# Patient Record
Sex: MALE | Race: WHITE | NOT HISPANIC OR LATINO | URBAN - METROPOLITAN AREA
[De-identification: names, ages, dates, MRNs, and addresses within clinical notes are randomized per-mention and may not be internally consistent; named-entity substitution may affect disease eponyms.]

---

## 2018-08-26 ENCOUNTER — EMERGENCY (EMERGENCY)
Facility: HOSPITAL | Age: 58
LOS: 0 days | Discharge: HOME | End: 2018-08-26
Admitting: PHYSICIAN ASSISTANT

## 2018-08-26 VITALS
RESPIRATION RATE: 18 BRPM | DIASTOLIC BLOOD PRESSURE: 78 MMHG | SYSTOLIC BLOOD PRESSURE: 120 MMHG | HEART RATE: 80 BPM | TEMPERATURE: 99 F | OXYGEN SATURATION: 96 %

## 2018-08-26 DIAGNOSIS — K08.89 OTHER SPECIFIED DISORDERS OF TEETH AND SUPPORTING STRUCTURES: ICD-10-CM

## 2018-08-26 RX ORDER — AMOXICILLIN 250 MG/5ML
1 SUSPENSION, RECONSTITUTED, ORAL (ML) ORAL
Qty: 21 | Refills: 0 | OUTPATIENT
Start: 2018-08-26 | End: 2018-09-01

## 2018-08-26 NOTE — ED PROVIDER NOTE - ENMT, MLM
Airway patent, Nasal mucosa clear. Mouth with normal mucosa. Throat has no vesicles, no oropharyngeal exudates and uvula is midline.  + tenderness and decay to tooth # 19 ; no gum fluctuance

## 2018-08-26 NOTE — ED ADULT NURSE NOTE - OBJECTIVE STATEMENT
Patient present to ED with complains of left sided dental pain for about 2 days, denies fever, chills, nausea, vomiting, and chest pain.

## 2018-08-26 NOTE — ED PROVIDER NOTE - OBJECTIVE STATEMENT
57 y/o M, no significant PMHx, presents to the ED with complaints of dental pain x three months. Patient admits to pain along his left lower tooth without associated facial swelling, fever, chills, nausea and vomiting. He went to the dental clinic when pain began and was advised that he would need a dental extraction and excisional biopsy however he has not yet followed up.

## 2018-08-26 NOTE — ED PROVIDER NOTE - ENMT NEGATIVE STATEMENT, MLM
+ Dental Pain ; Ears: no ear pain and no hearing problems.Nose: no nasal congestion and no nasal drainage.Mouth/Throat: no dysphagia, no hoarseness and no throat pain.Neck: no lumps, no pain, no stiffness and no swollen glands.

## 2018-08-26 NOTE — ED ADULT NURSE NOTE - NSIMPLEMENTINTERV_GEN_ALL_ED
Implemented All Universal Safety Interventions:  [place] to call system. Call bell, personal items and telephone within reach. Instruct patient to call for assistance. Room bathroom lighting operational. Non-slip footwear when patient is off stretcher. Physically safe environment: no spills, clutter or unnecessary equipment. Stretcher in lowest position, wheels locked, appropriate side rails in place.

## 2019-01-01 ENCOUNTER — OUTPATIENT (OUTPATIENT)
Dept: OUTPATIENT SERVICES | Facility: HOSPITAL | Age: 59
LOS: 1 days | End: 2019-01-01
Payer: COMMERCIAL

## 2019-01-01 PROCEDURE — G9001: CPT

## 2019-01-18 ENCOUNTER — EMERGENCY (EMERGENCY)
Facility: HOSPITAL | Age: 59
LOS: 0 days | Discharge: HOME | End: 2019-01-18
Admitting: PHYSICIAN ASSISTANT

## 2019-01-18 VITALS
SYSTOLIC BLOOD PRESSURE: 124 MMHG | HEART RATE: 83 BPM | TEMPERATURE: 97 F | RESPIRATION RATE: 18 BRPM | DIASTOLIC BLOOD PRESSURE: 78 MMHG | OXYGEN SATURATION: 97 %

## 2019-01-18 DIAGNOSIS — K08.89 OTHER SPECIFIED DISORDERS OF TEETH AND SUPPORTING STRUCTURES: ICD-10-CM

## 2019-01-18 DIAGNOSIS — Z71.89 OTHER SPECIFIED COUNSELING: ICD-10-CM

## 2019-01-18 DIAGNOSIS — Z79.2 LONG TERM (CURRENT) USE OF ANTIBIOTICS: ICD-10-CM

## 2019-01-18 NOTE — PROGRESS NOTE ADULT - SUBJECTIVE AND OBJECTIVE BOX
Patient is a 58y old  Male who presents with a chief complaint of dental pain from lower left side.     HPI: Pain started a few months back, and patient noticed there was a sinus tract coming and going from the alveolar ridge distal to tooth #19.     PAST MEDICAL & SURGICAL HISTORY:  ( -  ) heart valve replacement  ( -  ) joint replacement  ( -  ) pregnancy    MEDICATIONS  (STANDING):  none  MEDICATIONS  (PRN):  none    Allergies  No known drug allergies    FAMILY HISTORY:    *SOCIAL HISTORY: ( -  ) Tobacco; ( -  ) ETOH    Vital Signs Last 24 Hrs  T(C): 36.3 (18 Jan 2019 08:41), Max: 36.3 (18 Jan 2019 08:41)  T(F): 97.4 (18 Jan 2019 08:41), Max: 97.4 (18 Jan 2019 08:41)  HR: 83 (18 Jan 2019 08:41) (83 - 83)  BP: 124/78 (18 Jan 2019 08:41) (124/78 - 124/78)  BP(mean): --  RR: 18 (18 Jan 2019 08:41) (18 - 18)  SpO2: 97% (18 Jan 2019 08:41) (97% - 97%)    LABS: none ordered    Last Dental Visit: << Last year >>    EOE:  TMJ ( -  ) clicks                     ( -  ) pops                     ( -  ) crepitus             Mandible: WNL             Facial bones and MOM: intact             ( -  ) trismus             ( -  ) lymphadenopathy             ( -  ) swelling             ( -  ) asymmetry             ( -  ) palpation             ( -  ) dyspnea             ( -  ) dysphagia             ( -  ) loss of consciousness    IOE:  Permanent dentition, partially edentulous.                        hard/soft palate:  ( -  ) palatal torus, <<No pathology noted>>            tongue/FOM <<No pathology noted>>            labial/buccal mucosa <<No pathology noted>>           ( +  ) percussion           ( +  ) palpation           ( -  ) swelling            ( +  ) abscess           ( -  ) sinus tract    *DENTAL RADIOGRAPHS: 1 periapical x-ray taken of #19.      *ASSESSMENT: Patient had tooth #18 extracted on 2016 and informed that he had a periapical lesion on tooth #19. Last year went to a private dentist and maxillofacial to get evaluation done for implants (April 2018). He was informed of periapical lesion and extensive radiolucency extending to area of tooth #18. Patient came today with pain and said that private dentist Tooth #19 was deemed nonrestorable and needs extraction, but due to extensive lesion patient was rescheduled for Wednesday with OMFS Attending. Clindamycin 150mg 1q6.     *PLAN: Extraction of tooth #19 on next Wednesday.    RECOMMENDATIONS:  1) Prescription of antibiotics given.   2) If any difficulty swallowing/breathing, fever occur, return to ER.     Anabel Friend, DMD  Spectra [phone]

## 2019-01-18 NOTE — ED PROVIDER NOTE - PHYSICAL EXAMINATION
GENERAL:  well appearing, non-toxic male in no acute distress  SKIN: skin warm, pink and dry. MMM. no facial swelling.   ENT:  Airway intact. Patent oropharynx without erythema or exudate. Uvula midline.   NECK: Neck supple. No meningismus. Trachea midline  PULM: CTAB. Normal respiratory effort. No respiratory distress. No wheezes, stridor, rales or rhonchi. No retractions  CV: RRR, no M/R/G.   ABD: Soft, non-tender, non-distended  NEURO: A+Ox3, no sensory/motor deficits

## 2019-01-18 NOTE — ED PROVIDER NOTE - OBJECTIVE STATEMENT
59 yo male with 57 yo male with no significant pmh presents to the ED c/o left lower dental pain x 1 week. Patient admits hes had dental issues x 1 year. Patient went to his dentist on Wednesday was told he will need oral surgery but patient couldn't get an appointment for several weeks. Patient states he was told he has an infection of his gingiva and tooth. Patient currently not on antibiotic. Denies fever, chills facial pain/swelling, headache, dizziness, throat pain, change in voice, excessive drooling, chest pain, sob, abd pain, N/V, UE/Le weakness or paresthesias.

## 2019-01-18 NOTE — ED ADULT TRIAGE NOTE - CHIEF COMPLAINT QUOTE
Left lower dental pain. sent in by md for worsening infection. patient is not on abx- sent in for dental. no fever no facial swelling

## 2019-01-18 NOTE — ED PROVIDER NOTE - NS ED ROS FT
Constitutional: no fever, chills   ENT: + left lower dental pain. no facial swelling.  no throat pain, no change in voice, no excessive drooling, no ear pain  Cardiovascular: no chest pain, no sob  Respiratory: no cough, no shortness of breat  Gastrointestinal: no nausea, vomiting or abd pain  Integumentary: no rash or skin changes. no edema  Neurological: no headache, no dizziness, no visual changes, no UE/LE weakness or paresthesias. no change in mental status. no neck pain or stiffness.

## 2019-01-18 NOTE — ED PROVIDER NOTE - MEDICAL DECISION MAKING DETAILS
Patient here for left sided lower dental pain, no facial swelling, no abscess, no fever. Patient brought to dental clinic for further evaluation and treatment.

## 2019-01-23 ENCOUNTER — OUTPATIENT (OUTPATIENT)
Dept: OUTPATIENT SERVICES | Facility: HOSPITAL | Age: 59
LOS: 1 days | Discharge: HOME | End: 2019-01-23

## 2020-06-01 ENCOUNTER — OUTPATIENT (OUTPATIENT)
Dept: OUTPATIENT SERVICES | Facility: HOSPITAL | Age: 60
LOS: 1 days | End: 2020-06-01

## 2020-07-07 DIAGNOSIS — Z71.89 OTHER SPECIFIED COUNSELING: ICD-10-CM

## 2021-05-21 PROBLEM — Z00.00 ENCOUNTER FOR PREVENTIVE HEALTH EXAMINATION: Status: ACTIVE | Noted: 2021-05-21

## 2021-06-03 ENCOUNTER — APPOINTMENT (OUTPATIENT)
Dept: UROLOGY | Facility: CLINIC | Age: 61
End: 2021-06-03
Payer: COMMERCIAL

## 2021-06-03 VITALS — HEIGHT: 68 IN | WEIGHT: 171 LBS | TEMPERATURE: 97.9 F | BODY MASS INDEX: 25.91 KG/M2

## 2021-06-03 DIAGNOSIS — Z80.0 FAMILY HISTORY OF MALIGNANT NEOPLASM OF DIGESTIVE ORGANS: ICD-10-CM

## 2021-06-03 DIAGNOSIS — Z87.442 PERSONAL HISTORY OF URINARY CALCULI: ICD-10-CM

## 2021-06-03 DIAGNOSIS — Z78.9 OTHER SPECIFIED HEALTH STATUS: ICD-10-CM

## 2021-06-03 PROCEDURE — 99204 OFFICE O/P NEW MOD 45 MIN: CPT

## 2021-06-07 ENCOUNTER — RESULT REVIEW (OUTPATIENT)
Age: 61
End: 2021-06-07

## 2021-06-14 LAB
ALBUMIN SERPL ELPH-MCNC: 4.2 G/DL
ALP BLD-CCNC: 84 U/L
ALT SERPL-CCNC: 15 U/L
AST SERPL-CCNC: 20 U/L
BASOPHILS # BLD AUTO: 0.03 K/UL
BASOPHILS NFR BLD AUTO: 0.4 %
BILIRUB DIRECT SERPL-MCNC: <0.2 MG/DL
BILIRUB INDIRECT SERPL-MCNC: >0.2 MG/DL
BILIRUB SERPL-MCNC: 0.4 MG/DL
EOSINOPHIL # BLD AUTO: 0.18 K/UL
EOSINOPHIL NFR BLD AUTO: 2.3 %
ESTRADIOL SERPL-MCNC: 35 PG/ML
HCT VFR BLD CALC: 43.6 %
HGB BLD-MCNC: 14.6 G/DL
IMM GRANULOCYTES NFR BLD AUTO: 0.4 %
LH SERPL-ACNC: 11.7 IU/L
LYMPHOCYTES # BLD AUTO: 2.14 K/UL
LYMPHOCYTES NFR BLD AUTO: 27.5 %
MAN DIFF?: NORMAL
MCHC RBC-ENTMCNC: 30.3 PG
MCHC RBC-ENTMCNC: 33.5 G/DL
MCV RBC AUTO: 90.5 FL
MONOCYTES # BLD AUTO: 0.68 K/UL
MONOCYTES NFR BLD AUTO: 8.8 %
NEUTROPHILS # BLD AUTO: 4.71 K/UL
NEUTROPHILS NFR BLD AUTO: 60.6 %
PLATELET # BLD AUTO: 267 K/UL
PROLACTIN SERPL-MCNC: 7.3 NG/ML
PROT SERPL-MCNC: 6.9 G/DL
PSA FREE FLD-MCNC: 19 %
PSA FREE SERPL-MCNC: 0.35 NG/ML
PSA SERPL-MCNC: 1.9 NG/ML
RBC # BLD: 4.82 M/UL
RBC # FLD: 13.5 %
WBC # FLD AUTO: 7.77 K/UL

## 2021-06-15 LAB — SHBG SERPL-SCNC: 67.2 NMOL/L

## 2021-06-16 LAB
TESTOSTERONE FREE/WEAKLY BND: 115.2 NG/DL
TESTOSTERONE TOTAL S: 907 NG/DL
TESTOSTERONE, % FREE/WEAKLY BND: 12.7 %

## 2021-06-17 ENCOUNTER — OUTPATIENT (OUTPATIENT)
Dept: OUTPATIENT SERVICES | Facility: HOSPITAL | Age: 61
LOS: 1 days | Discharge: HOME | End: 2021-06-17
Payer: MEDICAID

## 2021-06-17 DIAGNOSIS — Z87.442 PERSONAL HISTORY OF URINARY CALCULI: ICD-10-CM

## 2021-06-17 PROCEDURE — 74019 RADEX ABDOMEN 2 VIEWS: CPT | Mod: 26

## 2021-06-17 PROCEDURE — 76770 US EXAM ABDO BACK WALL COMP: CPT | Mod: 26

## 2021-06-29 LAB
TESTOST BND SERPL-MCNC: 10.8 PG/ML
TESTOST SERPL-MCNC: 887 NG/DL

## 2021-09-17 ENCOUNTER — APPOINTMENT (OUTPATIENT)
Dept: UROLOGY | Facility: CLINIC | Age: 61
End: 2021-09-17
Payer: MEDICAID

## 2021-09-17 VITALS
HEIGHT: 68 IN | HEART RATE: 65 BPM | SYSTOLIC BLOOD PRESSURE: 113 MMHG | BODY MASS INDEX: 25.76 KG/M2 | WEIGHT: 170 LBS | DIASTOLIC BLOOD PRESSURE: 66 MMHG

## 2021-09-17 DIAGNOSIS — N48.6 INDURATION PENIS PLASTICA: ICD-10-CM

## 2021-09-17 DIAGNOSIS — R68.89 OTHER GENERAL SYMPTOMS AND SIGNS: ICD-10-CM

## 2021-09-17 DIAGNOSIS — Z87.898 PERSONAL HISTORY OF OTHER SPECIFIED CONDITIONS: ICD-10-CM

## 2021-09-17 DIAGNOSIS — N50.0 ATROPHY OF TESTIS: ICD-10-CM

## 2021-09-17 PROCEDURE — 99214 OFFICE O/P EST MOD 30 MIN: CPT

## 2021-11-08 ENCOUNTER — APPOINTMENT (OUTPATIENT)
Dept: UROLOGY | Facility: CLINIC | Age: 61
End: 2021-11-08
Payer: MEDICAID

## 2021-11-08 VITALS
SYSTOLIC BLOOD PRESSURE: 116 MMHG | HEIGHT: 68 IN | DIASTOLIC BLOOD PRESSURE: 70 MMHG | WEIGHT: 171 LBS | HEART RATE: 90 BPM | BODY MASS INDEX: 25.91 KG/M2

## 2021-11-08 DIAGNOSIS — N20.0 CALCULUS OF KIDNEY: ICD-10-CM

## 2021-11-08 PROCEDURE — 99214 OFFICE O/P EST MOD 30 MIN: CPT

## 2021-11-08 NOTE — PHYSICAL EXAM
[General Appearance - Well Developed] : well developed [General Appearance - Well Nourished] : well nourished [Normal Appearance] : normal appearance [Well Groomed] : well groomed [General Appearance - In No Acute Distress] : no acute distress [FreeTextEntry1] : Thin [] : no respiratory distress [Respiration, Rhythm And Depth] : normal respiratory rhythm and effort [Exaggerated Use Of Accessory Muscles For Inspiration] : no accessory muscle use [Oriented To Time, Place, And Person] : oriented to person, place, and time [Affect] : the affect was normal [Mood] : the mood was normal [Not Anxious] : not anxious [Normal Station and Gait] : the gait and station were normal for the patient's age

## 2021-11-08 NOTE — PHYSICAL EXAM
[General Appearance - Well Developed] : well developed [General Appearance - Well Nourished] : well nourished [Normal Appearance] : normal appearance [Well Groomed] : well groomed [General Appearance - In No Acute Distress] : no acute distress [FreeTextEntry1] : Thin [] : no respiratory distress [Respiration, Rhythm And Depth] : normal respiratory rhythm and effort [Exaggerated Use Of Accessory Muscles For Inspiration] : no accessory muscle use [Oriented To Time, Place, And Person] : oriented to person, place, and time [Affect] : the affect was normal [Mood] : the mood was normal [Not Anxious] : not anxious

## 2021-11-08 NOTE — PHYSICAL EXAM
[General Appearance - Well Developed] : well developed [General Appearance - Well Nourished] : well nourished [Normal Appearance] : normal appearance [Well Groomed] : well groomed [General Appearance - In No Acute Distress] : no acute distress [FreeTextEntry1] : Thin [] : no respiratory distress [Respiration, Rhythm And Depth] : normal respiratory rhythm and effort [Exaggerated Use Of Accessory Muscles For Inspiration] : no accessory muscle use [Oriented To Time, Place, And Person] : oriented to person, place, and time [Mood] : the mood was normal [Affect] : the affect was normal [Not Anxious] : not anxious [Normal Station and Gait] : the gait and station were normal for the patient's age

## 2021-11-08 NOTE — ASSESSMENT
[FreeTextEntry1] : With respect to the kidney stone sent him to get a CAT scan to see what the Hounsfield units are as if it is indeed about 300 we can try chemical dissolution.  If it is higher than that we will have to discuss with him his options\par \par With respect to the abnormal ANTHONY and PSA we will just follow that over time\par \par With respect to the ED I still need [place] criteria classification if he can get that for us we can try hand do with the need for sildenafil.\par \par With respect to the atrophic testicles they are working well enough but the elevated LH tells us that the Leydig cells are under strain it is something that will need to be watched.

## 2021-11-08 NOTE — LETTER BODY
[Dear  ___] : Dear  [unfilled], [Courtesy Letter:] : I had the pleasure of seeing your patient, [unfilled], in my office today. [Please see my note below.] : Please see my note below. [Sincerely,] : Sincerely, [FreeTextEntry2] : Ac Sheets MD\par [address]\[place] [address]

## 2021-11-08 NOTE — ASSESSMENT
[FreeTextEntry1] : There are several issues here.  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  As well I would need [place] criteria classification.

## 2021-11-08 NOTE — LETTER BODY
[Dear  ___] : Dear  [unfilled], [Courtesy Letter:] : I had the pleasure of seeing your patient, [unfilled], in my office today. [Please see my note below.] : Please see my note below. [Sincerely,] : Sincerely, [FreeTextEntry2] : Ac Sheets MD\par [address]\[place] [address]

## 2021-11-08 NOTE — LETTER HEADER
[FreeTextEntry3] : Uvaldo Doll M.D.\par Director of Urology\par [facility]\par [address] [place] [address], [address]\[place] [address]

## 2021-11-08 NOTE — LETTER BODY
[Dear  ___] : Dear  [unfilled], [Courtesy Letter:] : I had the pleasure of seeing your patient, [unfilled], in my office today. [Please see my note below.] : Please see my note below. [Sincerely,] : Sincerely, [FreeTextEntry2] : Ac Sheets MD\par [address]\[place] [address]

## 2021-11-08 NOTE — LETTER HEADER
[FreeTextEntry3] : Uvaldo Doll M.D.\par Director of Urology\par [facility]\par [address] [place] [address], [address]\[place] [address]

## 2021-11-08 NOTE — LETTER HEADER
[FreeTextEntry3] : Uvaldo Doll M.D.\par Director of Urology\par [facility]\par [address] [place] [address], [address]\[place] [address]

## 2021-11-08 NOTE — HISTORY OF PRESENT ILLNESS
[FreeTextEntry1] : Eyad is a 61-year-old male born May 18, 1960 seen on Nemo 3 and then September 17, 2021.  He has a history of stones, sometime in May he was in the shower and he thought he felt the stone come out because he had not had a evaluation in a while will check to see if they were more stones so we sent him for a CAT scan.  That was not done until October 21, 2021 and is here to review\par \par As well his erections are not what he feels that they should be.  All studies were checked in July 2021 were normal.  We are awaiting [employer] card.  To see if he would be an acceptable candidate for PDE 5 inhibitors. [Erectile Dysfunction] : Erectile Dysfunction

## 2021-11-08 NOTE — HISTORY OF PRESENT ILLNESS
[Erectile Dysfunction] : Erectile Dysfunction [FreeTextEntry1] : Eyad is a 61-year-old male who about 8 years ago had left-sided renal colic and ended up passing a stone without surgical intervention.  He never had an evaluation and has not seen a urologist since that time.  In general he has no known medical problems he was feeling good until about a month ago.  At that time he noticed some right-sided discomfort and then about 30 days ago when in the shower he was trying to urinate he had an inability, he felt a sharp pain at the tip of the penis and after about 20 seconds it felt like something shot out and then he was able to urinate normally.  Since then the pain in his side has gone away.  He does not know if it was a stone, he did not see a stone.  Given his past history he would like to know if he has any more stones as if so would like to take care of it before it bothers him\par \par Please note he has a history of ED treated with sildenafil in the past he has not used it in a while but he thinks his wife would want him to get a new prescription

## 2021-11-08 NOTE — PHYSICAL EXAM
[General Appearance - Well Developed] : well developed [General Appearance - Well Nourished] : well nourished [Normal Appearance] : normal appearance [Well Groomed] : well groomed [General Appearance - In No Acute Distress] : no acute distress [Heart Rate And Rhythm] : Heart rate and rhythm were normal [Respiration, Rhythm And Depth] : normal respiratory rhythm and effort [Exaggerated Use Of Accessory Muscles For Inspiration] : no accessory muscle use [Auscultation Breath Sounds / Voice Sounds] : lungs were clear to auscultation bilaterally [Abdomen Soft] : soft [Abdomen Tenderness] : non-tender [Abdomen Hernia] : no hernia was discovered [Costovertebral Angle Tenderness] : no ~M costovertebral angle tenderness [Urethral Meatus] : meatus normal [Penis Abnormality] : normal circumcised penis [Scrotum] : the scrotum was normal [Epididymis] : the epididymides were normal [Testes Tenderness] : no tenderness of the testes [Testes Mass (___cm)] : there were no testicular masses [Anus Abnormality] : the anus and perineum were normal [Rectal Exam - Rectum] : digital rectal exam was normal [Prostate Enlargement] : the prostate was not enlarged [Prostate Tenderness] : the prostate was not tender [Prostate Size ___ gm] : prostate size [unfilled] gm [Normal Station and Gait] : the gait and station were normal for the patient's age [] : no rash [FreeTextEntry1] : DTR's & BC reflexes were intact

## 2021-11-08 NOTE — LETTER BODY
[Dear  ___] : Dear  [unfilled], [Consult Letter:] : I had the pleasure of evaluating your patient, [unfilled]. [Please see my note below.] : Please see my note below. [Consult Closing:] : Thank you very much for allowing me to participate in the care of this patient.  If you have any questions, please do not hesitate to contact me. [Sincerely,] : Sincerely, [FreeTextEntry2] : Ac Sheets MD\par [address]\[place] [address]

## 2021-11-08 NOTE — LETTER HEADER
[FreeTextEntry3] : Uvaldo Doll M.D.\par Director of Urology\par [facility]\par [address] [place] [address], [address]\[place] [address]

## 2021-11-08 NOTE — ASSESSMENT
[FreeTextEntry1] : With respect to the CAT scan he has no stones, as far as his lung fields he will take that up with Dr. Sheets.\par \par With respect to his ED he has no hormonal issues his [place] criteria classification is "I" and the best options at this point in healthcare would be the PDE 5 inhibitors.  We discussed the difference between generic sildenafil as well as Cialis including but not limited to time to onset duration of response as well as cost with the various side effects.  And he chooses to go ahead with sildenafil.  Is cheaper, he tried it in the past and it did not bother him.  Risks discussed included nonischemic arterial optic neuropathy, ringing in the ears, low vision, dyspepsia, and if he goes with Cialis some of these do not happen but you can get severe muscle aches.  As well neither of them might work.  We will give him a prescription for 20 pills he will come back in 2 months if it is working well great if is not working well he wants to consider other options he will call I will see if we can get him in for Doppler study in the hopes of efficacy and then he can learn self injection

## 2021-11-08 NOTE — HISTORY OF PRESENT ILLNESS
[FreeTextEntry1] : Eyad is a 61-year-old male born May 18, 1960 seen on Nemo 3, 2021 with transient voiding dysfunction, history of kidney stones, erectile dysfunction and atrophic testicles.  We sent him for a renal bladder ultrasound, blood tests including hormones and given the slight prostate asymmetry we also got a PSA and given his erectile dysfunction asked him to get [place] criteria classification.  He tells me that his doctor had the paper was going to send it.  Unfortunately I do not have it in my computer and he will go by his doctor and get it again he had the rest of the test done and is here to review.  Please note he tells me that voiding dysfunction was truly transient and since last seen he has no trouble with urination.  He has a slight curve of the penis it is not bothersome to him\par \par At the time he had presented he had right-sided flank pain and thought he may have passed a stone he no longer has that pain [Erectile Dysfunction] : Erectile Dysfunction

## 2021-11-08 NOTE — HISTORY OF PRESENT ILLNESS
[FreeTextEntry1] : Eyad is a 61-year-old male born May 18, 1960 seen on Nemo 3, 2021 with transient voiding dysfunction, history of kidney stones, erectile dysfunction and atrophic testicles.  We sent him for a renal bladder ultrasound, blood tests including hormones and given the slight prostate asymmetry we also got a PSA and given his erectile dysfunction asked him to get [place] criteria classification.  He tells me that his doctor had the paper was going to send it.  Unfortunately I do not have it in my computer and he will go by his doctor and get it again he had the rest of the test done and is here to review.  Please note he tells me that voiding dysfunction was truly transient and since last seen he has no trouble with urination.  He has a slight curve of the penis it is not bothersome to him\par \par At the time he had presented he had right-sided flank pain and thought he may have passed a stone he no longer has that pain [Erectile Dysfunction] : Erectile Dysfunction

## 2021-11-08 NOTE — ASSESSMENT
[FreeTextEntry1] : With respect to the kidney stone sent him to get a CAT scan to see what the Hounsfield units are as if it is indeed about 300 we can try chemical dissolution.  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

## 2022-01-10 ENCOUNTER — APPOINTMENT (OUTPATIENT)
Dept: UROLOGY | Facility: CLINIC | Age: 62
End: 2022-01-10
Payer: MEDICAID

## 2022-01-10 PROCEDURE — 99214 OFFICE O/P EST MOD 30 MIN: CPT | Mod: 95

## 2022-01-10 NOTE — LETTER BODY
[Dear  ___] : Dear  [unfilled], [Courtesy Letter:] : I had the pleasure of seeing your patient, [unfilled], in my office today. [Please see my note below.] : Please see my note below. [Sincerely,] : Sincerely, [FreeTextEntry2] : Ac Sheets MD\par [address]\[place] [address]

## 2022-01-10 NOTE — HISTORY OF PRESENT ILLNESS
[Home] : at home, [unfilled] , at the time of the visit. [Medical Office: ([facility])___] : at the medical office located in  [FreeTextEntry3] : he has received, reviewed and agreed to the telemedicine consent  [FreeTextEntry1] : Eyad is a 61-year-old male who on May 18, 1960 last seen on November 8, 2021 with a question of kidney stones and history of erectile.  With respect to his erections his hormones were normal his [place] criteria classification was normal and we started him on Sildenafil anywhere from 20 to 100 mg anywhere from 1/2-hour to 1 hour before sexual activity preferably on an empty stomach.  If it works great if not we will consider a Doppler study and consider intracavernosal injection.\par \par He tells me that on the sildenafil going up to the 10 mg dose taking it 60 minutes before sexual activity, though with food in his stomach  that his erections are 75/100 and is enough for sex.\par \par [phone]\par [phone]\par [email or web address]\par PT STATES NO TESTING

## 2022-01-10 NOTE — ASSESSMENT
[FreeTextEntry1] : The pills are working well though he took them with food and something I had told him would not help and if he wants to get a little better erection he can take it on an empty stomach.  He tells me that his PCP gave him refills up until November he has no other issues that he wishes addressed so he can come back in a year unless there are problems

## 2022-01-10 NOTE — LETTER HEADER
[FreeTextEntry3] : Uvaldo Doll M.D.\par Director Emeritus of Urology\par [facility]\par [address] [place] [address], [address]\[place] [address]

## 2022-07-18 RX ORDER — SILDENAFIL 100 MG/1
100 TABLET, FILM COATED ORAL
Qty: 20 | Refills: 1 | Status: ACTIVE | COMMUNITY
Start: 2021-11-08 | End: 1900-01-01

## 2023-11-03 ENCOUNTER — APPOINTMENT (OUTPATIENT)
Dept: UROLOGY | Facility: CLINIC | Age: 63
End: 2023-11-03
Payer: MEDICAID

## 2023-11-03 VITALS
SYSTOLIC BLOOD PRESSURE: 112 MMHG | WEIGHT: 173 LBS | BODY MASS INDEX: 26.22 KG/M2 | HEART RATE: 70 BPM | DIASTOLIC BLOOD PRESSURE: 65 MMHG | HEIGHT: 68 IN

## 2023-11-03 DIAGNOSIS — N52.9 MALE ERECTILE DYSFUNCTION, UNSPECIFIED: ICD-10-CM

## 2023-11-03 PROCEDURE — 99213 OFFICE O/P EST LOW 20 MIN: CPT

## 2023-11-03 RX ORDER — SILDENAFIL 100 MG/1
100 TABLET, FILM COATED ORAL
Qty: 30 | Refills: 1 | Status: ACTIVE | COMMUNITY
Start: 2023-11-03 | End: 1900-01-01

## 2025-02-26 ENCOUNTER — APPOINTMENT (OUTPATIENT)
Dept: UROLOGY | Facility: CLINIC | Age: 65
End: 2025-02-26
Payer: MEDICAID

## 2025-02-26 VITALS
HEIGHT: 68 IN | BODY MASS INDEX: 25.64 KG/M2 | DIASTOLIC BLOOD PRESSURE: 71 MMHG | RESPIRATION RATE: 18 BRPM | OXYGEN SATURATION: 96 % | HEART RATE: 84 BPM | SYSTOLIC BLOOD PRESSURE: 113 MMHG | WEIGHT: 169.2 LBS

## 2025-02-26 DIAGNOSIS — N48.6 INDURATION PENIS PLASTICA: ICD-10-CM

## 2025-02-26 DIAGNOSIS — N52.9 MALE ERECTILE DYSFUNCTION, UNSPECIFIED: ICD-10-CM

## 2025-02-26 PROCEDURE — 99213 OFFICE O/P EST LOW 20 MIN: CPT
